# Patient Record
Sex: FEMALE | Race: WHITE | Employment: FULL TIME | URBAN - METROPOLITAN AREA
[De-identification: names, ages, dates, MRNs, and addresses within clinical notes are randomized per-mention and may not be internally consistent; named-entity substitution may affect disease eponyms.]

---

## 2021-07-20 ENCOUNTER — OFFICE VISIT (OUTPATIENT)
Dept: ORTHOPEDIC SURGERY | Age: 36
End: 2021-07-20
Payer: COMMERCIAL

## 2021-07-20 VITALS
BODY MASS INDEX: 34.04 KG/M2 | HEIGHT: 62 IN | WEIGHT: 185 LBS | OXYGEN SATURATION: 99 % | HEART RATE: 79 BPM | RESPIRATION RATE: 16 BRPM | TEMPERATURE: 97.4 F

## 2021-07-20 DIAGNOSIS — Z01.812 PRE-OPERATIVE LABORATORY EXAMINATION: ICD-10-CM

## 2021-07-20 DIAGNOSIS — M25.521 RIGHT ELBOW PAIN: ICD-10-CM

## 2021-07-20 DIAGNOSIS — R20.0 NUMBNESS AND TINGLING IN BOTH HANDS: Primary | ICD-10-CM

## 2021-07-20 DIAGNOSIS — M79.641 PAIN IN BOTH HANDS: ICD-10-CM

## 2021-07-20 DIAGNOSIS — Z01.811 PRE-OPERATIVE RESPIRATORY EXAMINATION: ICD-10-CM

## 2021-07-20 DIAGNOSIS — M79.642 PAIN IN BOTH HANDS: ICD-10-CM

## 2021-07-20 DIAGNOSIS — G56.01 CARPAL TUNNEL SYNDROME ON RIGHT: Primary | ICD-10-CM

## 2021-07-20 DIAGNOSIS — R20.2 NUMBNESS AND TINGLING IN BOTH HANDS: Primary | ICD-10-CM

## 2021-07-20 DIAGNOSIS — M77.11 LATERAL EPICONDYLITIS OF RIGHT ELBOW: ICD-10-CM

## 2021-07-20 DIAGNOSIS — Z01.810 PRE-OPERATIVE CARDIOVASCULAR EXAMINATION: ICD-10-CM

## 2021-07-20 DIAGNOSIS — G56.01 CARPAL TUNNEL SYNDROME ON RIGHT: ICD-10-CM

## 2021-07-20 PROCEDURE — 20605 DRAIN/INJ JOINT/BURSA W/O US: CPT | Performed by: SPECIALIST

## 2021-07-20 PROCEDURE — 99204 OFFICE O/P NEW MOD 45 MIN: CPT | Performed by: SPECIALIST

## 2021-07-20 RX ORDER — CYCLOBENZAPRINE HCL 10 MG
10 TABLET ORAL
COMMUNITY
Start: 2021-05-27

## 2021-07-20 RX ORDER — BETAMETHASONE SODIUM PHOSPHATE AND BETAMETHASONE ACETATE 3; 3 MG/ML; MG/ML
3 INJECTION, SUSPENSION INTRA-ARTICULAR; INTRALESIONAL; INTRAMUSCULAR; SOFT TISSUE ONCE
Status: COMPLETED | OUTPATIENT
Start: 2021-07-20 | End: 2021-07-20

## 2021-07-20 RX ORDER — IBUPROFEN 200 MG
200 TABLET ORAL
COMMUNITY

## 2021-07-20 RX ADMIN — BETAMETHASONE SODIUM PHOSPHATE AND BETAMETHASONE ACETATE 3 MG: 3; 3 INJECTION, SUSPENSION INTRA-ARTICULAR; INTRALESIONAL; INTRAMUSCULAR; SOFT TISSUE at 09:54

## 2021-07-20 NOTE — PROGRESS NOTES
Patient: Heladio Villarreal                MRN: [id number]       SSN: [id number]  YOB: 1985        AGE: 28 y.o. SEX: female    PCP: Reyes Hoard, MD  07/20/21    Chief Complaint   Patient presents with    Elbow Pain     right    Wrist Pain     bilateral     HISTORY:  Heladio Villarreal is a 28 y.o. female who is seen for right elbow, bilateral shoulder R>L, and bilateral wrist pain. She believes she may have injured her right elbow and shoulder while on a recent camping trip. She experiences weakness and numbness when doing overhead activities and movements. She states that her pain was so bad that she was in tears last night. She has been taking ibuprofen. She is also seen for bilateral hand and arm numbness, tingling, and pain. She has been experiencing hand numbness, stinging, burning, and tingling for the past several weeks. She reports difficulty pinching, grabbing, and holding items. She reports increased numbness and tingling at night and with use. She had a BUE EMG/NCS on 1/4/19 which was c/w R carpal tunnel syndrome. She uses braces when not working. She responded to previous DeQuervain's injections years ago. She is s/p neurosurgical decompression procedure for Chiari malformation. She still sees her neurologist every 3 months for follow up. 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 She lives alone in [place]. She has a 9 yo daughter. Her daughter is currently in a [employer] summer camp. Ms. Wanda Lind weighs 185 lbs and is 5'2\" tall.        No results found for: HBA1C, AQS9YJUY, ZJC4FSFS, RLH3PYAO  Weight Metrics 7/20/2021 7/24/2018 7/12/2018 11/9/2016 11/9/2016   Weight 185 lb 171 lb 9.6 oz 167 lb 173 lb 167 lb 8.8 oz   BMI 33.84 kg/m2 32.42 kg/m2 31.55 kg/m2 32.69 kg/m2 31.66 kg/m2       There is no problem list on file for this patient. REVIEW OF SYSTEMS:    Constitutional Symptoms: Negative   Eyes: Negative   Ears, Nose, Throat and Mouth: Negative   Cardiovascular: Negative   Respiratory: Negative   Genitourinary: Per HPI   Gastrointestinal: Per HPI   Integumentary (Skin and/or Breast): Negative   Musculoskeletal: Per HPI   Endocrine/Rheumatologic: Negative   Neurological: Per HPI   Hematology/Lymphatic: Negative    Allergic/Immunologic: Negative   Phychiatric: Negative    Social History     Socioeconomic History    Marital status: UNKNOWN     Spouse name: Not on file    Number of children: Not on file    Years of education: Not on file    Highest education level: Not on file   Occupational History    Not on file   Tobacco Use    Smoking status: Never Smoker    Smokeless tobacco: Never Used   Substance and Sexual Activity    Alcohol use: Yes     Comment: occasional  Social    Drug use: No    Sexual activity: Not on file   Other Topics Concern    Not on file   Social History Narrative    Not on file     Social Determinants of Health     Financial Resource Strain:     Difficulty of Paying Living Expenses:    Food Insecurity:     Worried About Running Out of Food in the Last Year:     Ran Out of Food in the Last Year:    Transportation Needs:     Lack of Transportation (Medical):      Lack of Transportation (Non-Medical):    Physical Activity:     Days of Exercise per Week:     Minutes of Exercise per Session:    Stress:     Feeling of Stress :    Social Connections:     Frequency of Communication with Friends and Family:     Frequency of Social Gatherings with Friends and Family:     Attends Anabaptist Services:     Active Member of Clubs or Organizations:     Attends Club or Organization Meetings:     Marital Status:    Intimate Partner Violence:     Fear of Current or Ex-Partner:     Emotionally Abused:     Physically Abused:     Sexually Abused: Allergies   Allergen Reactions    Latex Hives and Rash    Codeine Rash      Current Outpatient Medications   Medication Sig    ibuprofen (MOTRIN) 200 mg tablet Take 200 mg by mouth.  cyclobenzaprine (FLEXERIL) 10 mg tablet Take 10 mg by mouth.  SUMAtriptan succinate (IMITREX STATDOSE KIT REFILL) 6 mg/0.5 mL crtg 6 mg by SubCUTAneous route as needed.  butalbital-acetaminophen-caffeine (FIORICET, ESGIC) -40 mg per tablet Take 1 Tab by mouth as needed for Pain.  ondansetron (ZOFRAN ODT) 8 mg disintegrating tablet Take 8 mg by mouth as needed for Nausea.  acetaminophen (TYLENOL EXTRA STRENGTH) 500 mg tablet Take 975 mg by mouth every six (6) hours as needed for Pain. (Patient not taking: Reported on 7/20/2021)    HYDROcodone-acetaminophen (NORCO) 5-325 mg per tablet Take  by mouth every four (4) hours as needed for Pain. (Patient not taking: Reported on 7/20/2021)    acyclovir (ZOVIRAX) 800 mg tablet Take 800 mg by mouth as needed. (Patient not taking: Reported on 7/20/2021)     No current facility-administered medications for this visit. PHYSICAL EXAMINATION:  Visit Vitals  Pulse 79   Temp 97.4 °F (36.3 °C)   Resp 16   Ht 5' 2\" (1.575 m)   Wt 185 lb (83.9 kg)   SpO2 99%   BMI 33.84 kg/m²    Appearance: Alert, well appearing and pleasant patient who is in no distress, oriented to person, place/time, and who follows commands. HEENT: Dusty Bray hears well, does not require hearing aids. Her sclera of the eyes are non-icteric. She is breathing normally and no respiratory accessory muscle use is noted. No JVD present and Neck ROM within normal limits. Psychiatric: Affect and mood are appropriate. Oriented x3  Cardiovascular/Peripheral Vascular: Normal pulses to each foot. Integumentary: No rashes.  Warm and normal color. No drainage. Gait: normal  Sensory Exam: decreased sensation to light touch in the median nerve distribution bilateral hands  Lymphatic: No evidence of Lymphedema  Vascular:       Pulses: palpable  Varicosities none  Wounds/Abrasion: None Present  Neuro: Negative, no tremors  ORTHO EXAMINATION:  Examination Right Wrist Left Wrist   Skin Intact Intact   Tenderness - -   Flexion 40 40   Extension 30 30   Deformity - -   Effusion - -   Finger flexion Full Full   Finger extension Full Full   Tinel's sign - -   Phalen's test + +   Finklestein maneuver - -   Pain with thumb abduction - -   Capillary refill - -      Examination Right Hand Left Hand   Skin Intact Intact   Deformity - -   Swelling - -   Tenderness - -   Finger flexion Full Full   Finger extension Full Full   Sensation Normal Normal   Capillary refill Normal Normal   Heberden's nodes - -   Dupuytren's - -     Examination Right Elbow Left Elbow   Skin Intact Intact   Range of Motion 120-0 135-0   Tenderness ++ lateral  -   Swelling - -   Bruising - -   Stability Normal Normal   Motor Strength  Normal Normal   Neurovascular Intact Intact           TIME OUT:  Chart reviewed for the following:   I, Asher Joshi MD, have reviewed the History, Physical and updated the Allergic reactions for Susana Whitt   TIME OUT performed immediately prior to start of procedure:  Kwadwo Bullock MD, have performed the following reviews on Susana Whitt prior to the start of the procedure:          * Patient was identified by name and date of birth   * Agreement on procedure being performed was verified  * Risks and Benefits explained to the patient  * Procedure site verified and marked as necessary  * Patient was positioned for comfort  * Consent was obtained     Time: 9:45 AM     Date of procedure: 7/20/2021  Procedure performed by:  Asher Joshi MD  Ms. Susan Grant tolerated the procedure well with no complications.        RUE EMG/NCS 1/4/19  Impression: There is electrophysiological evidence of a mild median neuropathy at the right wrist. These findings are consistent with mild right carpal tunnel syndrome. IMPRESSION:      ICD-10-CM ICD-9-CM    1. Numbness and tingling in both hands  R20.0 782.0     R20.2     2. Pain in both hands  M79.641 729.5     M79.642     3. Right elbow pain  M25.521 719.42 betamethasone (CELESTONE) injection 3 mg      DRAIN/INJECT INTERMEDIATE JOINT/BURSA   4. Lateral epicondylitis of right elbow  M77.11 726.32 betamethasone (CELESTONE) injection 3 mg      DRAIN/INJECT INTERMEDIATE JOINT/BURSA   5. Carpal tunnel syndrome on right  G56.01 354.0      PLAN:  After discussing treatment options, patient's right lateral epicondyle was injected with 1/2 cc Marcaine and 1/2 cc Celestone. She will be scheduled for a right carpal tunnel release. Risks of surgery outlined and informed consent obtained. She will follow up for H&P. She will follow up as needed.       Scribed by Deacon Rodriguez ([address]) as dictated by Allison Mendiola MD

## 2021-07-28 ENCOUNTER — TELEPHONE (OUTPATIENT)
Dept: ORTHOPEDIC SURGERY | Age: 36
End: 2021-07-28

## 2021-07-28 DIAGNOSIS — Z98.890 POST-OPERATIVE STATE: ICD-10-CM

## 2021-07-28 DIAGNOSIS — G56.01 CARPAL TUNNEL SYNDROME ON RIGHT: Primary | ICD-10-CM

## 2021-07-28 RX ORDER — DIPHENHYDRAMINE HCL 25 MG
25 CAPSULE ORAL
Qty: 20 CAPSULE | Refills: 0 | Status: SHIPPED | OUTPATIENT
Start: 2021-07-28 | End: 2021-08-02

## 2021-07-28 RX ORDER — OXYCODONE AND ACETAMINOPHEN 5; 325 MG/1; MG/1
1 TABLET ORAL
Qty: 28 TABLET | Refills: 0 | Status: SHIPPED | OUTPATIENT
Start: 2021-07-28 | End: 2021-08-04

## 2021-07-28 RX ORDER — ONDANSETRON 4 MG/1
4 TABLET, ORALLY DISINTEGRATING ORAL
Qty: 30 TABLET | Refills: 1 | Status: SHIPPED | OUTPATIENT
Start: 2021-07-28

## 2021-07-29 NOTE — TELEPHONE ENCOUNTER
Received call from patient via answering service. Patient is s/p right wrist surgery completed on 7/28/2021 and states that she was not provided any post op medications. The following prescriptions have been e-prescribed to the patients [address] located on [facility]:    Orders Placed This Encounter    oxyCODONE-acetaminophen (Percocet) 5-325 mg per tablet     Sig: Take 1 Tablet by mouth every six (6) hours as needed for Pain for up to 7 days. Max Daily Amount: 4 Tablets. Dispense:  28 Tablet     Refill:  0    diphenhydrAMINE (BenadryL) 25 mg capsule     Sig: Take 1 Capsule by mouth every six (6) hours as needed for Itching for up to 5 days. Indications: itching     Dispense:  20 Capsule     Refill:  0    ondansetron (ZOFRAN ODT) 4 mg disintegrating tablet     Sig: Take 1 Tablet by mouth every eight (8) hours as needed for Nausea or Vomiting. Indications: prevent nausea and vomiting after surgery     Dispense:  30 Tablet     Refill:  1           Kala King [facility], MPA, PA-C  7/28/2021  8:21 PM

## 2021-07-30 ENCOUNTER — OFFICE VISIT (OUTPATIENT)
Dept: ORTHOPEDIC SURGERY | Age: 36
End: 2021-07-30
Payer: COMMERCIAL

## 2021-07-30 VITALS — TEMPERATURE: 96.7 F

## 2021-07-30 DIAGNOSIS — G56.01 CARPAL TUNNEL SYNDROME ON RIGHT: Primary | ICD-10-CM

## 2021-07-30 DIAGNOSIS — Z98.890 S/P CARPAL TUNNEL RELEASE: Primary | ICD-10-CM

## 2021-07-30 PROCEDURE — 99024 POSTOP FOLLOW-UP VISIT: CPT | Performed by: PHYSICIAN ASSISTANT

## 2021-07-30 NOTE — LETTER
NOTIFICATION RETURN TO WORK    7/30/2021 1:17 PM    Ms. Marisa Prince  [facility] [address] [id number]      To Whom It May Concern:    Marisa Prince is currently under the care of [address] [place] [address]. Please allow the above named patient light or administrative duties until August 23, 2021. Additionally, please excuse Ms Eris Gallego from surgical duties until August 23, 2021. If there are questions or concerns please have the patient contact our office.         Sincerely,      Radhika Ochoa PA-C

## 2021-07-30 NOTE — PROGRESS NOTES
Lina Pitt returns the office 2 days status post right hand open carpal tunnel release. She is doing generally well today. She had some pain and throbbing at the surgical site which does improve when she elevates her hand at or just above the level of her heart. She is gently bending her fingers however still lacking about 6 cm palm to pulp with some stiffness in the wrist just proximal to the surgical site. She has full extension. She has no change in the sensation deficit that was present prior to surgery. The patient's wound was cleaned today and a new sterile dressing placed. Wound measured [id number] 3 cm. Interrupted vertical mattress sutures noted. Methylene blue marker standing also noted with soft tissue swelling bracketing the site. No indurations or evidence of skin breakdown. There was moderate spreading ecchymosis throughout the entire palmar aspect of the right hand and extending about 6 cm proximal from the right hand radiocarpal joint. Plan: Patient return the office in 1 week for wound check and likely suture removal.  Today all of her questions answered to her satisfaction. She is recommended to avoid getting the surgical site wet.

## 2021-08-09 ENCOUNTER — OFFICE VISIT (OUTPATIENT)
Dept: ORTHOPEDIC SURGERY | Age: 36
End: 2021-08-09
Payer: COMMERCIAL

## 2021-08-09 DIAGNOSIS — Z98.890 S/P CARPAL TUNNEL RELEASE: Primary | ICD-10-CM

## 2021-08-09 PROCEDURE — 99024 POSTOP FOLLOW-UP VISIT: CPT | Performed by: SPECIALIST

## 2021-08-09 RX ORDER — SULFAMETHOXAZOLE AND TRIMETHOPRIM 800; 160 MG/1; MG/1
1 TABLET ORAL 2 TIMES DAILY
Qty: 20 TABLET | Refills: 0 | Status: SHIPPED | OUTPATIENT
Start: 2021-08-09

## 2021-08-09 NOTE — PROGRESS NOTES
Patient: Shaina Garcia                MRN: [id number]       SSN: [id number]  YOB: 1985        AGE: 28 y.o. SEX: female    PCP: Tristan Green MD  08/09/21    Chief Complaint   Patient presents with    Wrist Pain     Right     HISTORY:  Shaina Garcia is a 28 y.o. female who is seen for right wrist pain. She is s/p right carpal tunnel release on 7/28/21. She had a yeast infection 48 hours after he surgery-- seen and treated by her gynecologist.     She is s/p neurosurgical decompression procedure for Chiari malformation. She still sees her neurologist every 3 months for follow up. 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 She has a 7 yo daughter. Her daughter is currently in a [employer] summer camp. Ms. Baljinder Best weighs 185 lbs and is 5'2\" tall. No results found for: HBA1C, WUJ2UZGJ, BGH3WVWJ, PTD4PIZG  Weight Metrics 7/20/2021 7/24/2018 7/12/2018 11/9/2016 11/9/2016   Weight 185 lb 171 lb 9.6 oz 167 lb 173 lb 167 lb 8.8 oz   BMI 33.84 kg/m2 32.42 kg/m2 31.55 kg/m2 32.69 kg/m2 31.66 kg/m2       There is no problem list on file for this patient.     REVIEW OF SYSTEMS:    Constitutional Symptoms: Negative   Eyes: Negative   Ears, Nose, Throat and Mouth: Negative   Cardiovascular: Negative   Respiratory: Negative   Genitourinary: Per HPI   Gastrointestinal: Per HPI   Integumentary (Skin and/or Breast): Negative   Musculoskeletal: Per HPI   Endocrine/Rheumatologic: Negative   Neurological: Per HPI   Hematology/Lymphatic: Negative    Allergic/Immunologic: Negative   Phychiatric: Negative    Social History     Socioeconomic History    Marital status: UNKNOWN     Spouse name: Not on file    Number of children: Not on file    Years of education: Not on file    Highest education level: Not on file   Occupational History    Not on file   Tobacco Use    Smoking status: Never Smoker    Smokeless tobacco: Never Used   Substance and Sexual Activity    Alcohol use: Yes     Comment: occasional  Social    Drug use: No    Sexual activity: Not on file   Other Topics Concern    Not on file   Social History Narrative    Not on file     Social Determinants of Health     Financial Resource Strain:     Difficulty of Paying Living Expenses:    Food Insecurity:     Worried About Running Out of Food in the Last Year:     [address] in the Last Year:    Transportation Needs:     Lack of Transportation (Medical):  Lack of Transportation (Non-Medical):    Physical Activity:     Days of Exercise per Week:     Minutes of Exercise per Session:    Stress:     Feeling of Stress :    Social Connections:     Frequency of Communication with Friends and Family:     Frequency of Social Gatherings with Friends and Family:     Attends Confucianist Services:     Active Member of Clubs or Organizations:     Attends Club or Organization Meetings:     Marital Status:    Intimate Partner Violence:     Fear of Current or Ex-Partner:     Emotionally Abused:     Physically Abused:     Sexually Abused: Allergies   Allergen Reactions    Latex Hives and Rash    Codeine Rash      Current Outpatient Medications   Medication Sig    ondansetron (ZOFRAN ODT) 4 mg disintegrating tablet Take 1 Tablet by mouth every eight (8) hours as needed for Nausea or Vomiting. Indications: prevent nausea and vomiting after surgery    ibuprofen (MOTRIN) 200 mg tablet Take 200 mg by mouth.  cyclobenzaprine (FLEXERIL) 10 mg tablet Take 10 mg by mouth.  acetaminophen (TYLENOL EXTRA STRENGTH) 500 mg tablet Take 975 mg by mouth every six (6) hours as needed for Pain.  (Patient not taking: Reported on 7/20/2021)  HYDROcodone-acetaminophen (NORCO) 5-325 mg per tablet Take  by mouth every four (4) hours as needed for Pain. (Patient not taking: Reported on 7/20/2021)    SUMAtriptan succinate (IMITREX STATDOSE KIT REFILL) 6 mg/0.5 mL crtg 6 mg by SubCUTAneous route as needed.  acyclovir (ZOVIRAX) 800 mg tablet Take 800 mg by mouth as needed. (Patient not taking: Reported on 7/20/2021)    butalbital-acetaminophen-caffeine (FIORICET, ESGIC) -40 mg per tablet Take 1 Tab by mouth as needed for Pain.  ondansetron (ZOFRAN ODT) 8 mg disintegrating tablet Take 8 mg by mouth as needed for Nausea. No current facility-administered medications for this visit. PHYSICAL EXAMINATION:   Appearance: Alert, well appearing and pleasant patient who is in no distress, oriented to person, place/time, and who follows commands. HEENT: Wendy Erb hears well, does not require hearing aids. Her sclera of the eyes are non-icteric. She is breathing normally and no respiratory accessory muscle use is noted. No JVD present and Neck ROM within normal limits. Psychiatric: Affect and mood are appropriate. Oriented x3  Cardiovascular/Peripheral Vascular: Normal pulses to each foot. Integumentary: No rashes. Warm and normal color. No drainage.    Gait: normal  Sensory Exam: decreased sensation to light touch in the median nerve distribution bilateral hands  Lymphatic: No evidence of Lymphedema  Vascular:       Pulses: palpable  Varicosities none  Wounds/Abrasion: None Present  Neuro: Negative, no tremors  ORTHO EXAMINATION:  Examination Right Wrist Left Wrist   Skin Intact, healing incision site  Suture line intact Intact   Tenderness - -   Flexion 40 40   Extension 30 30   Deformity - -   Effusion - -   Finger flexion Full Full   Finger extension Full Full   Tinel's sign - -   Phalen's test + +   Finklestein maneuver - -   Pain with thumb abduction - -   Capillary refill - -         RUE EMG/NCS 1/4/19  Impression: There is electrophysiological evidence of a mild median neuropathy at the right wrist. These findings are consistent with mild right carpal tunnel syndrome. IMPRESSION:    R CTS  PLAN: Sutures out. She will follow up in one month.     Scribed by Anjali Cheney ([address]) as dictated by Efrain Jamil MD

## 2021-08-09 NOTE — LETTER
NOTIFICATION RETURN TO WORK    8/9/2021 4:34 PM    Ms. Wesley Nunez  [facility] [address] [phone]      To Whom It May Concern:    Wesley Nunez is currently under the care of [address] [place] [address]. She was seen in our office today, August 9, 2021. Please excuse Ms Andrzej Goss from work Thursday, August 11, 2021. If there are questions or concerns please have the patient contact our office.         Sincerely,      Zoey Hall MD

## 2021-08-13 ENCOUNTER — OFFICE VISIT (OUTPATIENT)
Dept: ORTHOPEDIC SURGERY | Age: 36
End: 2021-08-13
Payer: COMMERCIAL

## 2021-08-13 VITALS — WEIGHT: 188 LBS | BODY MASS INDEX: 34.6 KG/M2 | HEIGHT: 62 IN

## 2021-08-13 DIAGNOSIS — Z98.890 S/P CARPAL TUNNEL RELEASE: Primary | ICD-10-CM

## 2021-08-13 PROCEDURE — 99024 POSTOP FOLLOW-UP VISIT: CPT | Performed by: PHYSICIAN ASSISTANT

## 2021-08-13 NOTE — PROGRESS NOTES
Cheri Pulido returns the office for wound check associated with her carpal tunnel release site. The site is improved significantly since she was seen earlier in the week. She is change the dressing daily. She has some discharge which was thin and yellowish on her Band-Aid yesterday but no evidence of joes infection. She is on Septra DS empirically 1 twice daily. She is taken 3 days worth of medication. On exam today the wound site is maturing nicely wound borders well approximated the soft tissue swelling which was evident prior when the sutures were present has near completely resolved. There is slight tenderness to palpation over the proximal third of the surgical site bracketing both sides. No erythema no evidence of infection. No active drainage noted today. Patient was seen and examined by Dr. Quinten Porter. He agrees wound is maturing nicely has expected. Patient will maintain her limited duty work status. Working to begin OT with focus on range of motion and gentle stretching. We will see her back in about 3 weeks. She is recommended to complete only 5 days worth of Septra DS at this point which would be 10 tablets total.  Today all of her questions answered to her satisfaction. She may get the surgical site wet but avoid any prolonged soaking.

## 2021-08-23 ENCOUNTER — TELEPHONE (OUTPATIENT)
Dept: ORTHOPEDIC SURGERY | Age: 36
End: 2021-08-23

## 2021-08-23 NOTE — TELEPHONE ENCOUNTER
OK per JOHANN Loera for letter excusing from work until after therapy starts. Letter created and signed at [facility] location. Called patient and left generic message, no identifiers. If patient calls back, letter is in the system for  at [facility]. Due to HIPAA we cannot email letter. Patient may set up [employer] account to access if desired.

## 2021-08-23 NOTE — TELEPHONE ENCOUNTER
Patient says she was advised at last office visit that she should have limited work status until she begins therapy, however, she never got an updated letter from us to provide her employer. She's at work now and needs this letter as soon as possible to provide them. Please prepare and advise patient when ready, [phone] (she is not currently enrolled in [employer] and is asking if it's possible to email her this letter at [email or web address])

## 2021-09-07 ENCOUNTER — HOSPITAL ENCOUNTER (OUTPATIENT)
Dept: PHYSICAL THERAPY | Age: 36
Discharge: HOME | End: 2021-09-07
Attending: PHYSICIAN ASSISTANT
Payer: COMMERCIAL

## 2021-09-07 PROCEDURE — 97530 THERAPEUTIC ACTIVITIES: CPT

## 2021-09-07 PROCEDURE — 97535 SELF CARE MNGMENT TRAINING: CPT

## 2021-09-07 PROCEDURE — 97165 OT EVAL LOW COMPLEX 30 MIN: CPT

## 2021-09-07 PROCEDURE — 97110 THERAPEUTIC EXERCISES: CPT

## 2021-09-07 NOTE — PROGRESS NOTES
Hand Therapy Evaluation and Daily Note    Patient Name: Agus Weir  Date:2021  : 1985  Age: 28 y.o.y/o  [x]  Patient  Verified  Payor: Jaswantisaac Carly / Plan: Clark Lopez PPO / Product Type: PPO /    Referring Provider: Brian Ramirez MD Visit:  None scheduled  Onset Date:  2 years ago  Surgical Date: 2021  Surgical Procedure: right open carpal tunnel release    In time:12:55 PM  Out time:1:45 PM  Total Treatment Time (min): 55  Total Timed Codes (min): 40  1:1 Treatment Time (MC only): 54   Visit #: 1 of 8    Treatment Area: Status post carpal tunnel release [Z98.890]    Precautions:    Hand Dominance: right handed   Hand Involved: right    Total Evaluation Time:  15    History of Present Condition:  Patient is a right hand dominant 28 y.o. female with a chief complaint  of right wrist pain s/p right open carpal tunnel release on 2021. Pt report an EMG 2 years ago that revealed bilateral median nerve compression at the wrists. She reports she received steroid injections as part of conservative mgmt however had minimal relief. Pain Rating:   Current: (0-no pain 10-debilitating pain) moderate 3  At best: (0-no pain 10-debilitating pain) moderate 3  At worst: (0-no pain 10-debilitating pain) moderate 3  Location: right hand and right wrist  Type:  moderate   Better with: ice, ibuprofen, tylenol  Worse with:     Medications/Allergies/Past Medical History:  See chart; reviewed with patient.  Allergies, back pain, headaches, sleep dysfunction, median nerve compression at the wrists    Diagnostic Tests: EMG revealed bilateral median nerve compression at the wrists    Prior Level of Function: Independent with ADL/IADL tasks using dominant right UE    Current Level of Function:  Min A with ADL/IADL tasks     Social History: Pt lives with 9 y/o daughter    Occupation/Job Requirements: surgical assistant     Observation: 2.5 cm incisino intact and healed  Scar/incision:   Band aid applied  Location:  Right volar wrist     Palpation:  Tenderness with palpation to surrounding areas at incision    Range of Motion:   Wrist Active/Passive ROM  Wrist 9/7/2021     Right       Flex 47       Ext 60       UD 30       RD 9                   Strength:   Measurements: Taken with Martínez Dynamometer, in Lbs   Level 2 9/7/2021    Date Date Date Date Date Date   Right 17         Left 61         Deficit          Change                Pinch Measurements: Taken with Pinch Gauge, in Lbs   (hand) 9/7/2021    Date Date Date Date Date   Lateral          Right 10        Left  15        Deficit         Change         Pad         Right 7        Left 10        Deficit         Change         Bijan         Right 10        Left 19        Deficit         Change             Sensation:    intact      Edema: GIRTH CHART measured in cm  Date: 9/7/2021       Side Right/Left    DPC circum. 20.1/19.5    Wrist Crease 16.8/16.1    FA      Elbow           Special Tests: N/a    ADLs  Feeding:        []MaxA   []ModA   []Tamika   [] CGA   []SBA   []Isidoro   [x]Independent  UE Dressing:       []MaxA   []ModA   [x]Tamika   [] CGA   []SBA   []Isidoro   []Independent  LE Dressing:       []MaxA   []ModA   [x]Tamika   [] CGA   []SBA   []Isidoro   []Independent  Grooming:       []MaxA   []ModA   [x]Tamika   [] CGA   []SBA   []Isidoro   []Independent  Toileting:       []MaxA   []ModA   []Tamika   [] CGA   []SBA   []Isidoro   [x]Independent  Bathing:       []MaxA   []ModA   []Tamika   [] CGA   []SBA   []Isidoro   [x]Independent  Light Meal Prep:    []MaxA   []ModA   []Tamika   [] CGA   []SBA   []Isidoro   []Independent  Household/Other: []MaxA   []ModA   [x]Tamika   [] CGA   []SBA   []Isidoro   []Independent  Adaptive Equip:     []MaxA   []ModA   []Tamika   [] CGA   []SBA   []Isidoro   []Independent  Driving:       []MaxA   []ModA   []Tamika   [] CGA   []SBA   []Isidoro   [x]Independent      Todays Treatment:  Patient received an initial evaluation today followed by education as to diagnosis, precautions and treatment plan. Patient was provided with a basic home exercise program including tendon glides, scar desensitization program, and  and pinch strengthening with graded therapy putty. OBJECTIVE    15 min Therapeutic Exercise:  [x] See flow sheet :   Rationale: increase ROM, increase strength, improve coordination and increase proprioception to improve the patients ability to increase functional use of right UE    10 min Therapeutic Activity:  [x]  See flow sheet :   Rationale: increase ROM and decrease hypersensitivity  to improve the patients ability to return to normal use of right UE. 15 min Self Care/Home Management: prognosis, diagnosis, treatment plan, activity modifications, scar mgmt   Rationale: education  to improve the patients ability to promote healing of surgical site. With   [] TE   [] TA   [] neuro   [] other: Patient Education: [x] Review HEP    [] Progressed/Changed HEP based on:   [] positioning   [] body mechanics   [] transfers   [] heat/ice application   [] Splint wear/care   [] Sensory re-education   [] scar management      [] other:      Pain Level (0-10 scale) post treatment: 3/10    Patient will continue to benefit from skilled OT services to modify and progress therapeutic interventions, address ROM deficits, address strength deficits and analyze and address soft tissue restrictions to attain goals. Assessment: Pt presents to skilled OT today with band aid applied over right volar wrist. Upon removal, there is a 2.5 cm scar that is healed and intact with mild redness. There is tenderness with palpation to incision and surrounding areas and pillar pain noted. She demonstrates limited wrist flexion at 47 degrees. There is hypersensitivity noted with light touch over the scar. She also demonstrates >50% reduced right hand  and pinch strength when compared to her left non dominant hand.   There is mild edema noted about the wrist crease and DPC circumference of the hand. Evaluation Complexity: History LOW Complexity : Brief history review  Examination LOW Complexity : 1-3 performance deficits relating to physical, cognitive , or psychosocial skils that result in activity limitations and / or participation restrictions  Clinical Decision Making LOW Complexity : No comorbidities that affect functional and no verbal or physical assistance needed to complete eval tasks   Overall Complexity Rating: LOW     Patient would benefit from OT/Hand therapy services for the following problems:  Problem List: Pain effecting function, Decreased range of motion, Decreased strength, Edema effecting function, Decreased coordination/prehension, Decreased ADL/functional abilities , Decreased activity tolerance, Decreased flexibility/joint mobility and Sensability     Treatment Plan may include any combination of the following: Therapeutic exercise, Therapeutic activities, Neuromuscular re-education, Physical agent/modality, Scar management, Manual therapy, Patient education and ADLs/IADLs    Patient / Family readiness to learn indicated by: asking questions, trying to perform skills and interest    Persons(s) to be included in education:   patient (P)    Barriers to Learning/Limitations: None    Patient Goal (s): to get back to work    Patient Self Reported Health Status: good    Rehabilitation Potential: good    Short Term Goals: To be accomplished in 2  weeks:  Goal:* Patient will be compliant with initial home exercise program to take an active role in their rehabilitation process. Status at [facility]: Patient was provided with a basic home exercise program including tendon glides, scar desensitization program, and  and pinch strengthening with graded therapy putty. Goal:* Patient will demonstrate a good understanding of their condition and strategies for self-management.   Status at [facility]: pt educated on prognosis, diagnosis, treatment plan, activity modifications, scar mgmt    Long Term Goals: To be accomplished in 4 weeks:   Goal:* Patient will have 65 degrees of right wrist extension in order to increase ease with ADL's such as bathing, eating and dressing and to eventually bear weight thru the right upper extremity as in pushing up from a chair. Status at Eval: 60 deg     Goal:* Patient will have 60 degrees of right wrist flexion in order to perform hygiene tasks and /or work with tools such as a screwdriver. Status at Eval: 47 deg    Goal:* Pt will have 30 pounds of  in the right hand to allow for functional grasp for all ADL activities including dressing, bathing and self care. Status at eval: 17#    Goal:* Patient will have improved right lateral pinch strength of  12 pounds in order to perform fine motor tasks such as turning pages, turning ignition and open containers. Status at eval: 10#    Goal:* Patient will have improved right mario pinch strength of 13 pounds in order to perform fine motor tasks such as writing. Status at eval: 10#    Goal:* Patient will show a 5 point improvement on FOTO functional status measure to improve overall functional performance.   Status at Eval: 72    Frequency / Duration: Patient to be seen 2 times per week for 4 weeks:    Patient/ Caregiver education and instruction: Diagnosis, prognosis, self care, activity modification and exercises    Archie Grady OT,  9/7/2021 1:00 PM

## 2021-09-07 NOTE — PROGRESS NOTES
[facility] [address]  [place], [address]  [phone] fax    Plan of Care/Statement of Necessity for Occupational Therapy Services    Patient name: Ellis Gillis Start of Care: 2021   Referral source: Lamine Jon : 1985    Medical Diagnosis: Status post carpal tunnel release [Z98.890]  Payor: Selwyn Vidal / Plan: Fay Hawkins PPO / Product Type: PPO /  Onset Date:2021    Treatment Diagnosis: right hand pain   Prior Hospitalization: see medical history Provider#: [id number]   Medications: Verified on Patient summary List    Comorbidities: Allergies, back pain, headaches, sleep dysfunction, median nerve compression at the wrists   Prior Level of Function: Independent with ADL/IADL tasks using dominant right UE. The Plan of Care and following information is based on the information from the initial evaluation. Assessment/ key information: Patient is a right hand dominant 28 y.o. female with a chief complaint  of right wrist pain s/p right open carpal tunnel release on 2021. Pt report an EMG 2 years ago that revealed bilateral median nerve compression at the wrists. She reports she received steroid injections as part of conservative mgmt however had minimal relief. Pt presents to skilled OT today with band aid applied over right volar wrist. Upon removal, there is a 2.5 cm scar that is healed and intact with mild redness. There is tenderness with palpation to incision and surrounding areas and pillar pain noted. She demonstrates limited wrist flexion at 47 degrees. There is hypersensitivity noted with light touch over the scar. She also demonstrates >50% reduced right hand  and pinch strength when compared to her left non dominant hand. There is mild edema noted about the wrist crease and DPC circumference of the hand. She reports limiting use of the right hand for regular daily activities.  Patient received an initial evaluation today followed by education as to diagnosis, precautions and treatment plan. Patient was provided with a basic home exercise program including tendon glides, scar desensitization program, and  and pinch strengthening with graded therapy putty. Skilled OT services are necessary to address deficits and improve quality of life. Evaluation Complexity: History LOW Complexity : Brief history review  Examination LOW Complexity : 1-3 performance deficits relating to physical, cognitive , or psychosocial skils that result in activity limitations and / or participation restrictions  Clinical Decision Making LOW Complexity : No comorbidities that affect functional and no verbal or physical assistance needed to complete eval tasks   Overall Complexity Rating: LOW     Patient would benefit from OT/Hand therapy services for the following problems:  Problem List: Pain effecting function, Decreased range of motion, Decreased strength, Edema effecting function, Decreased coordination/prehension, Decreased ADL/functional abilities , Decreased activity tolerance, Decreased flexibility/joint mobility and Sensability     Treatment Plan may include any combination of the following: Therapeutic exercise, Therapeutic activities, Neuromuscular re-education, Physical agent/modality, Scar management, Manual therapy, Patient education and ADLs/IADLs    Patient / Family readiness to learn indicated by: asking questions, trying to perform skills and interest    Persons(s) to be included in education:   patient (P)    Barriers to Learning/Limitations: None    Patient Goal (s): to get back to work    Patient Self Reported Health Status: good    Rehabilitation Potential: good    Short Term Goals: To be accomplished in 2  weeks:  Goal:* Patient will be compliant with initial home exercise program to take an active role in their rehabilitation process.   Status at Eval: Patient was provided with a basic home exercise program including tendon glides, scar desensitization program, and  and pinch strengthening with graded therapy putty. Goal:* Patient will demonstrate a good understanding of their condition and strategies for self-management. Status at Eval: pt educated on prognosis, diagnosis, treatment plan, activity modifications, scar mgmt    Long Term Goals: To be accomplished in 4 weeks:   Goal:* Patient will have 65 degrees of right wrist extension in order to increase ease with ADL's such as bathing, eating and dressing and to eventually bear weight thru the right upper extremity as in pushing up from a chair. Status at Eval: 60 deg     Goal:* Patient will have 60 degrees of right wrist flexion in order to perform hygiene tasks and /or work with tools such as a screwdriver. Status at Eval: 47 deg    Goal:* Pt will have 30 pounds of  in the right hand to allow for functional grasp for all ADL activities including dressing, bathing and self care. Status at eval: 17#    Goal:* Patient will have improved right lateral pinch strength of  12 pounds in order to perform fine motor tasks such as turning pages, turning ignition and open containers. Status at eval: 10#    Goal:* Patient will have improved right mario pinch strength of 13 pounds in order to perform fine motor tasks such as writing. Status at eval: 10#    Goal:* Patient will show a 5 point improvement on FOTO functional status measure to improve overall functional performance. Status at Eval: 72    Frequency / Duration: Patient to be seen 2 times per week for 4 weeks:    Patient/ Caregiver education and instruction: Diagnosis, prognosis, self care, activity modification and exercises  [x]  Plan of care has been reviewed with Mary Rivera OT 9/7/2021 2:05 PM  ________________________________________________________________________    I certify that the above Therapy Services are being furnished while the patient is under my care.  I agree with the treatment plan and certify that this therapy is necessary.     [phone] [id number] Signature:____________Date:_________TIME:________     Clara Taylor PA-C  ** Signature, Date and Time must be completed for valid certification **    Please sign and return to [facility] [address]  [address] [facility] [address]  [place], [address]  [phone] fax

## 2021-09-14 ENCOUNTER — HOSPITAL ENCOUNTER (OUTPATIENT)
Dept: PHYSICAL THERAPY | Age: 36
Discharge: HOME | End: 2021-09-14
Attending: PHYSICIAN ASSISTANT
Payer: COMMERCIAL

## 2021-09-14 PROCEDURE — 97022 WHIRLPOOL THERAPY: CPT

## 2021-09-14 PROCEDURE — 97110 THERAPEUTIC EXERCISES: CPT

## 2021-09-14 NOTE — PROGRESS NOTES
OT DAILY TREATMENT NOTE     Patient Name: Derick Gasca  Date:2021  : 1985  [x]  Patient  Verified  Payor: Lily Braun / Plan: Rolando Herzog PPO / Product Type: PPO /    In time:12:00 PM  Out time:12:46 PM  Total Treatment Time (min): 55  Visit #: 2 of 8    Treatment Area: Status post carpal tunnel release [Z98.890]    SUBJECTIVE  Pain Level (0-10 scale): 0/10  Any medication changes, allergies to medications, adverse drug reactions, diagnosis change, or new procedure performed?: [x] No    [] Yes (see summary sheet for update)  Subjective functional status/changes:   [] No changes reported  \"It's actually a lot better and not as sensitive. \"    OBJECTIVE    Modality rationale: decrease pain and increase tissue extensibility to improve the patients ability to functionally use right hand.     Min Type Additional Details    [] Estim:  []Unatt       []IFC  []Premod                        []Other:  []w/ice   []w/heat  Position:  Location:    [] Estim: []Att    []TENS instruct  []NMES                    []Other:  []w/US   []w/ice   []w/heat  Position:  Location:    []  Traction: [] Cervical       []Lumbar                       [] Prone          []Supine                       []Intermittent   []Continuous Lbs:  [] before manual  [] after manual    []  Ultrasound: []Continuous   [] Pulsed                           []1MHz   []3MHz W/cm2:  Location:    []  Iontophoresis with dexamethasone         Location: [] Take home patch   [] In clinic   15 []  Ice     [x]  Heat fluidotherapy  []  Ice massage  []  Laser   []  Paraffin Position: AROM  Location: right hand    []  Laser with stim  []  Other:  Position:  Location:    []  Vasopneumatic Device    []  Right     []  Left  Pre-treatment girth:  Post-treatment girth:  Measured at (location):  Pressure:       [] lo [] med [] hi   Temperature: [] lo [] med [] hi       [x] Skin assessment post-treatment:  [x]intact []redness- no adverse reaction    []redness  adverse reaction:     31 min Therapeutic Exercise:  [x] See flow sheet :   Rationale: increase ROM, increase strength and improve coordination to improve the patients ability to increase functional use of right hand. With   [] TE   [] TA   [] neuro   [] other: Patient Education: [x] Review HEP    [] Progressed/Changed HEP based on:   [] positioning   [] body mechanics   [] transfers   [] heat/ice application   [] Splint wear/care   [] Sensory re-education   [] scar management      [] other:            Other Objective/Functional Measures: improved tolerance to touch over right volar wrist scar     Pain Level (0-10 scale) post treatment: 0/10    ASSESSMENT/Changes in Function: Improving touch to right volar wrist.  Initiated fluidotherapy without difficulty reported. Minor c/o pain with pinch tasks. Will progress as tolerated. Patient will continue to benefit from skilled OT services to modify and progress therapeutic interventions, address ROM deficits, address strength deficits and analyze and address soft tissue restrictions to attain remaining goals. []  See Plan of Care  []  See progress note/recertification  []  See Discharge Summary         Progress towards goals / Updated goals:  Short Term Goals: To be accomplished in 2  weeks:  Goal:* Patient will be compliant with initial home exercise program to take an active role in their rehabilitation process. Status at [facility]: Patient was provided with a basic home exercise program including tendon glides, scar desensitization program, and  and pinch strengthening with graded therapy putty. 9/14/2021 - pt reports compliance with HEP      Goal:* Patient will demonstrate a good understanding of their condition and strategies for self-management.   Status at [facility]: pt educated on prognosis, diagnosis, treatment plan, activity modifications, scar mgmt  9/14/2021 - pt reports compliance with scar desensitization and demo improved tolerance to touch     Long Term Goals: To be accomplished in 4 weeks:              Goal:* Patient will have 65 degrees of right wrist extension in order to increase ease with ADL's such as bathing, eating and dressing and to eventually bear weight thru the right upper extremity as in pushing up from a chair. Status at Eval: 60 deg      Goal:* Patient will have 60 degrees of right wrist flexion in order to perform hygiene tasks and /or work with tools such as a screwdriver. Status at Eval: 47 deg     Goal:* Pt will have 30 pounds of  in the right hand to allow for functional grasp for all ADL activities including dressing, bathing and self care. Status at eval: 17#     Goal:* Patient will have improved right lateral pinch strength of  12 pounds in order to perform fine motor tasks such as turning pages, turning ignition and open containers. Status at eval: 10#     Goal:* Patient will have improved right mario pinch strength of 13 pounds in order to perform fine motor tasks such as writing. Status at eval: 10#     Goal:* Patient will show a 5 point improvement on FOTO functional status measure to improve overall functional performance.   Status at Eval: 72    PLAN  []  Upgrade activities as tolerated     [x]  Continue plan of care  []  Update interventions per flow sheet       []  Discharge due to:_  []  Other:_      Lu Chase OT 9/14/2021  12:06 PM    Future Appointments   Date Time Provider Roland Conn   9/21/2021  4:30 PM [facility]   [facility]

## 2021-09-21 ENCOUNTER — HOSPITAL ENCOUNTER (OUTPATIENT)
Dept: PHYSICAL THERAPY | Age: 36
Discharge: HOME | End: 2021-09-21
Attending: PHYSICIAN ASSISTANT
Payer: COMMERCIAL

## 2021-09-21 PROCEDURE — 97110 THERAPEUTIC EXERCISES: CPT

## 2021-09-21 PROCEDURE — 97022 WHIRLPOOL THERAPY: CPT

## 2021-09-21 NOTE — PROGRESS NOTES
OT DAILY TREATMENT NOTE     Patient Name: Vicky Henry  Date:2021  : 1985  [x]  Patient  Verified  Payor: Buck Kelley / Plan: Noe Rosado PPO / Product Type: PPO /    In time:4:30 PM  Out time:5:15 PM  Total Treatment Time (min): 45  Visit #: 3 of 8    Treatment Area: Status post carpal tunnel release [Z98.890]    SUBJECTIVE  Pain Level (0-10 scale): 0/10  Any medication changes, allergies to medications, adverse drug reactions, diagnosis change, or new procedure performed?: [x] No    [] Yes (see summary sheet for update)  Subjective functional status/changes:   [] No changes reported  \"It's a lot better. \"    OBJECTIVE    Modality rationale: decrease pain and increase tissue extensibility to improve the patients ability to increase functional use of right hand.     Min Type Additional Details    [] Estim:  []Unatt       []IFC  []Premod                        []Other:  []w/ice   []w/heat  Position:  Location:    [] Estim: []Att    []TENS instruct  []NMES                    []Other:  []w/US   []w/ice   []w/heat  Position:  Location:    []  Traction: [] Cervical       []Lumbar                       [] Prone          []Supine                       []Intermittent   []Continuous Lbs:  [] before manual  [] after manual    []  Ultrasound: []Continuous   [] Pulsed                           []1MHz   []3MHz W/cm2:  Location:    []  Iontophoresis with dexamethasone         Location: [] Take home patch   [] In clinic   15 []  Ice     [x]  Heat fluidotherapy  []  Ice massage  []  Laser   []  Paraffin Position: AROM  Location: right wrist/hand    []  Laser with stim  []  Other:  Position:  Location:    []  Vasopneumatic Device    []  Right     []  Left  Pre-treatment girth:  Post-treatment girth:  Measured at (location):  Pressure:       [] lo [] med [] hi   Temperature: [] lo [] med [] hi       [x] Skin assessment post-treatment:  [x]intact [x]redness- no adverse reaction    []redness - adverse reaction:   25 min Therapeutic Exercise:  [x] See flow sheet :   Rationale: increase ROM, increase strength and increase proprioception to improve the patients ability to grasp,  and pinch using right hand. 5 min Manual Therapy:  IASTM using tools #4, 6 using sweeping and fanning techniques   The manual therapy interventions were performed at a separate and distinct time from the therapeutic activities interventions. Rationale: decrease pain, increase ROM, increase tissue extensibility and decrease edema  to right volar wrist scar       With   [] TE   [] TA   [] neuro   [] other: Patient Education: [x] Review HEP    [] Progressed/Changed HEP based on:   [] positioning   [] body mechanics   [] transfers   [] heat/ice application   [] Splint wear/care   [] Sensory re-education   [] scar management      [] other:            Other Objective/Functional Measures: improved right hand pinch strength with activity      Pain Level (0-10 scale) post treatment: 0/10    ASSESSMENT/Changes in Function: Initiated IASTM to right volar wrist and palm scar and pt tolerated this well. No increased pain with increased resistive activity. Will progress with strengthening as tolerated. Patient will continue to benefit from skilled OT services to modify and progress therapeutic interventions, address ROM deficits, address strength deficits, analyze and address soft tissue restrictions and instruct in home and community integration to attain remaining goals. []  See Plan of Care  []  See progress note/recertification  []  See Discharge Summary         Progress towards goals / Updated goals:  Short Term Goals: To be accomplished in 2  weeks:  Goal:* Patient will be compliant with initial home exercise program to take an active role in their rehabilitation process.   Status at al: Patient was provided with a basic home exercise program including tendon glides, scar desensitization program, and  and pinch strengthening with graded therapy putty. 9/14/2021 - pt reports compliance with HEP  9/21/2021 - pt reports continued compliance, goal met      Goal:* Patient will demonstrate a good understanding of their condition and strategies for self-management. Status at Eval: pt educated on prognosis, diagnosis, treatment plan, activity modifications, scar mgmt  9/14/2021 - pt reports compliance with scar desensitization and demo improved tolerance to touch   9/21/2021 - good tolerance to touch on scar, goal met    Long Term Goals: To be accomplished in 4 weeks:              Goal:* Patient will have 65 degrees of right wrist extension in order to increase ease with ADL's such as bathing, eating and dressing and to eventually bear weight thru the right upper extremity as in pushing up from a chair. Status at Eval: 60 deg      Goal:* Patient will have 60 degrees of right wrist flexion in order to perform hygiene tasks and /or work with tools such as a screwdriver. Status at Eval: 47 deg     Goal:* Pt will have 30 pounds of  in the right hand to allow for functional grasp for all ADL activities including dressing, bathing and self care. Status at eval: 17#     Goal:* Patient will have improved right lateral pinch strength of  12 pounds in order to perform fine motor tasks such as turning pages, turning ignition and open containers. Status at eval: 10#  9/21/2021 - progressing with in clinic tasks. Goal:* Patient will have improved right mario pinch strength of 13 pounds in order to perform fine motor tasks such as writing. Status at eval: 10#  9/21/2021 - progressing with in clinic tasks.      Goal:* Patient will show a 5 point improvement on FOTO functional status measure to improve overall functional performance.   Status at Eval: 65    PLAN  []  Upgrade activities as tolerated     [x]  Continue plan of care  []  Update interventions per flow sheet       []  Discharge due to:_  []  Other:_      Bhupinder Guzman OT 9/21/2021  3:59 PM    Future Appointments   Date Time Provider Roland Conn   9/21/2021  4:30 PM Alok Quiles, [facility]   9/23/2021  4:30 PM Alok Quiles OT [facility]

## 2021-10-28 NOTE — PROGRESS NOTES
[facility] [address] [facility]  [place], [address]  [phone] fax    Occupational Therapy Discharge Summary    Patient name: Rosy Reynolds Start of Care: 2021   Referral source: [facility] : 1985   Medical/Treatment Diagnosis: Status post carpal tunnel release [Z98.890]  Payor: Eliana Lux / Plan: [id number] RPN / Product Type: Commerical /  Onset Date:2021     Prior Hospitalization: see medical history Provider#: [id number]   Medications: Verified on Patient Summary List     Comorbidities: Allergies, back pain, headaches, sleep dysfunction, median nerve compression at the wrists   Prior Level of Function: Independent with ADL/IADL tasks using dominant right UE. Visits from Start of Care: 3    Missed Visits: 2  Reporting Period : 2021 to 2021    Summary of Care:  Short Term Goals: To be accomplished in 2  weeks:  Goal:* Patient will be compliant with initial home exercise program to take an active role in their rehabilitation process. Status at Eval: Patient was provided with a basic home exercise program including tendon glides, scar desensitization program, and  and pinch strengthening with graded therapy putty. 2021 - pt reports compliance with HEP  2021 - pt reports continued compliance, goal met      Goal:* Patient will demonstrate a good understanding of their condition and strategies for self-management.   Status at [facility]: pt educated on prognosis, diagnosis, treatment plan, activity modifications, scar mgmt  2021 - pt reports compliance with scar desensitization and demo improved tolerance to touch   2021 - good tolerance to touch on scar, goal met     Long Term Goals: To be accomplished in 4 weeks:              Goal:* Patient will have 65 degrees of right wrist extension in order to increase ease with ADL's such as bathing, eating and dressing and to eventually bear weight thru the right upper extremity as in pushing up from a chair. Status at Eval: 60 deg      Goal:* Patient will have 60 degrees of right wrist flexion in order to perform hygiene tasks and /or work with tools such as a screwdriver. Status at Eval: 47 deg     Goal:* Pt will have 30 pounds of  in the right hand to allow for functional grasp for all ADL activities including dressing, bathing and self care. Status at eval: 17#     Goal:* Patient will have improved right lateral pinch strength of  12 pounds in order to perform fine motor tasks such as turning pages, turning ignition and open containers. Status at eval: 10#  9/21/2021 - progressing with in clinic tasks.      Goal:* Patient will have improved right mario pinch strength of 13 pounds in order to perform fine motor tasks such as writing. Status at eval: 10#  9/21/2021 - progressing with in clinic tasks.      Goal:* Patient will show a 5 point improvement on FOTO functional status measure to improve overall functional performance. Status at Eval: 65    ASSESSMENT/RECOMMENDATIONS: Unable to further assess goals due to pt abdicated therapy. Will d/c at this time.    [x]Discontinue therapy: []Patient has reached or is progressing toward set goals      [x]Patient is non-compliant or has abdicated      []Due to lack of appreciable progress towards set goals    Ramos Rom, OT 10/28/2021 11:01 AM

## 2022-03-16 ENCOUNTER — OFFICE VISIT (OUTPATIENT)
Dept: ORTHOPEDIC SURGERY | Age: 37
End: 2022-03-16
Payer: COMMERCIAL

## 2022-03-16 VITALS
HEART RATE: 95 BPM | WEIGHT: 180 LBS | TEMPERATURE: 98.6 F | BODY MASS INDEX: 33.99 KG/M2 | OXYGEN SATURATION: 98 % | HEIGHT: 61 IN

## 2022-03-16 DIAGNOSIS — G56.02 LEFT CARPAL TUNNEL SYNDROME: Primary | ICD-10-CM

## 2022-03-16 PROCEDURE — 99214 OFFICE O/P EST MOD 30 MIN: CPT | Performed by: SPECIALIST

## 2022-03-16 RX ORDER — ALBUTEROL SULFATE 90 UG/1
AEROSOL, METERED RESPIRATORY (INHALATION)
COMMUNITY
Start: 2022-01-19

## 2022-03-16 NOTE — PROGRESS NOTES
Patient: Anupama Aguillon                MRN: [id number]       SSN: [id number]  YOB: 1985        AGE: 39 y.o. SEX: female    PCP: Belkys Sapp MD  03/16/22    Chief Complaint   Patient presents with    Hand Pain     Left     HISTORY:  Anupama Aguillon is a 39 y.o. female who is seen for left hand numbness, tingling, and pain. She has been experiencing left numbness and tingling for the past several months. She reports difficulty pinching, grabbing, and holding items. She reports increased numbness and tingling at night and with use. Her whole hand felt like it was on fire two nights ago. She notes a painful burning sensation every night. A LUE EMG/NCS on 1/27/22 by Dr. Lisa Mcmanus revealed mild CTS. She was previously seen for right wrist pain. She is s/p right carpal tunnel release on 7/28/21 -- doing well. She had a yeast infection 48 hours after he surgery-- seen and treated by her gynecologist.     She is s/p neurosurgical decompression procedure for Chiari malformation. She still sees her neurologist every 3 months for follow up. Pain Assessment  3/16/2022   Location of Pain Hand   Location Modifiers Left   Severity of Pain 10   Quality of Pain Sharp;Dull;Aching;Burning; Other (Comment); Throbbing   Quality of Pain Comment Tingling   Duration of Pain Persistent   Frequency of Pain Constant   Date Pain First Started -   Date Pain First Started Comment -   Aggravating Factors -   Aggravating Factors Comment -   Limiting Behavior Yes   Relieving Factors Nothing   Result of Injury No     Occupation, etc:  Ms. 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 She lives with her 7 yo daughter in [place]. Ms. Kymberly Mcgovern weighs 180 lbs and is 5'2\" tall.        No results found for: HBA1C, OWB8KTNE, SBU0SXYQ, NOH7NGBM  Weight Metrics 3/16/2022 8/13/2021 7/20/2021 7/24/2018 7/12/2018 11/9/2016 11/9/2016   Weight 180 lb 188 lb 185 lb 171 lb 9.6 oz 167 lb 173 lb 167 lb 8.8 oz BMI 34.01 kg/m2 34.39 kg/m2 33.84 kg/m2 32.42 kg/m2 31.55 kg/m2 32.69 kg/m2 31.66 kg/m2       There is no problem list on file for this patient. REVIEW OF SYSTEMS:    Constitutional Symptoms: Negative   Eyes: Negative   Ears, Nose, Throat and Mouth: Negative   Cardiovascular: Negative   Respiratory: Negative   Genitourinary: Per HPI   Gastrointestinal: Per HPI   Integumentary (Skin and/or Breast): Negative   Musculoskeletal: Per HPI   Endocrine/Rheumatologic: Negative   Neurological: Per HPI   Hematology/Lymphatic: Negative    Allergic/Immunologic: Negative   Phychiatric: Negative    Social History     Socioeconomic History    Marital status: UNKNOWN     Spouse name: Not on file    Number of children: Not on file    Years of education: Not on file    Highest education level: Not on file   Occupational History    Not on file   Tobacco Use    Smoking status: Never Smoker    Smokeless tobacco: Never Used   Vaping Use    Vaping Use: Never used   Substance and Sexual Activity    Alcohol use: Not Currently    Drug use: No    Sexual activity: Not on file   Other Topics Concern    Not on file   Social History Narrative    Not on file     Social Determinants of Health     Financial Resource Strain:     Difficulty of Paying Living Expenses: Not on file   Food Insecurity:     Worried About Running Out of Food in the Last Year: Not on file    Lu of Food in the Last Year: Not on file   Transportation Needs:     Lack of Transportation (Medical): Not on file    Lack of Transportation (Non-Medical):  Not on file   Physical Activity:     Days of Exercise per Week: Not on file    Minutes of Exercise per Session: Not on file   Stress:     Feeling of Stress : Not on file   Social Connections:     Frequency of Communication with Friends and Family: Not on file    Frequency of Social Gatherings with Friends and Family: Not on file    Attends Religion Services: Not on file   [employer] of Clubs or Organizations: Not on file    Attends Club or Organization Meetings: Not on file    Marital Status: Not on file   Intimate Partner Violence:     Fear of Current or Ex-Partner: Not on file    Emotionally Abused: Not on file    Physically Abused: Not on file    Sexually Abused: Not on file   Housing Stability:     Unable to Pay for Housing in the Last Year: Not on file    Number of [place] in the Last Year: Not on file    Unstable Housing in the Last Year: Not on file      Allergies   Allergen Reactions    Latex Hives and Rash    Codeine Rash    Pcn [Penicillins] Hives      Current Outpatient Medications   Medication Sig    albuterol (PROVENTIL HFA, VENTOLIN HFA, PROAIR HFA) 90 mcg/actuation inhaler inhale 1-2 puffs by mouth every 4 hours    ondansetron (ZOFRAN ODT) 4 mg disintegrating tablet Take 1 Tablet by mouth every eight (8) hours as needed for Nausea or Vomiting. Indications: prevent nausea and vomiting after surgery    ibuprofen (MOTRIN) 200 mg tablet Take 200 mg by mouth.  cyclobenzaprine (FLEXERIL) 10 mg tablet Take 10 mg by mouth.  acyclovir (ZOVIRAX) 800 mg tablet Take 800 mg by mouth as needed.  butalbital-acetaminophen-caffeine (FIORICET, ESGIC) -40 mg per tablet Take 1 Tab by mouth as needed for Pain.  ondansetron (ZOFRAN ODT) 8 mg disintegrating tablet Take 8 mg by mouth as needed for Nausea.  trimethoprim-sulfamethoxazole (BACTRIM DS, SEPTRA DS) 160-800 mg per tablet Take 1 Tablet by mouth two (2) times a day. (Patient not taking: Reported on 3/16/2022)    acetaminophen (TYLENOL EXTRA STRENGTH) 500 mg tablet Take 975 mg by mouth every six (6) hours as needed for Pain. (Patient not taking: Reported on 7/20/2021)    HYDROcodone-acetaminophen (NORCO) 5-325 mg per tablet Take  by mouth every four (4) hours as needed for Pain.  (Patient not taking: Reported on 7/20/2021)    SUMAtriptan succinate (IMITREX STATDOSE KIT REFILL) 6 mg/0.5 mL crtg 6 mg by SubCUTAneous route as needed. (Patient not taking: Reported on 3/16/2022)     No current facility-administered medications for this visit. PHYSICAL EXAMINATION:   Appearance: Alert, well appearing and pleasant patient who is in no distress, oriented to person, place/time, and who follows commands. HEENT: Juan Francisco Doty hears well, does not require hearing aids. Her sclera of the eyes are non-icteric. She is breathing normally and no respiratory accessory muscle use is noted. No JVD present and Neck ROM within normal limits. Psychiatric: Affect and mood are appropriate. Oriented x3  Cardiovascular/Peripheral Vascular: Normal pulses to each foot. Integumentary: No rashes. Warm and normal color. No drainage. Gait: normal  Sensory Exam: decreased sensation to light touch in the median nerve distribution left hand  Lymphatic: No evidence of Lymphedema  Vascular:       Pulses: palpable  Varicosities none  Wounds/Abrasion: well healed R CTR incision site  Neuro: Negative, no tremors  ORTHO EXAMINATION:  Examination Right Wrist Left Wrist   Skin Intact, healing incision site  Suture line intact Intact   Tenderness - -   Flexion 40 40   Extension 30 30   Deformity - -   Effusion - -   Finger flexion Full Full   Finger extension Full Full   Tinel's sign - +   Phalen's test - +   Finklestein maneuver - -   Pain with thumb abduction - -   Capillary refill - -     LUE EMG/NCS 1/27/22 LUZ MARINA  Impression: the electrophysiologic findings are those of a very mild left median neuropathy at the wrist    RUE EMG/NCS 1/4/19  Impression: There is electrophysiological evidence of a mild median neuropathy at the right wrist. These findings are consistent with mild right carpal tunnel syndrome. IMPRESSION:    Left carpal tunnel sydrome  PLAN: She will be scheduled for a left carpal tunnel release due to her severe symptoms. Risks of surgery outlined and informed consent obtained. She will follow up for H&P.       Scribed by Lila Fernandez Trice Barone MD ([address]) as dictated by Hawa Partida MD

## 2022-04-04 DIAGNOSIS — M25.532 LEFT WRIST PAIN: Primary | ICD-10-CM

## 2022-04-14 ENCOUNTER — TELEPHONE (OUTPATIENT)
Dept: ORTHOPEDIC SURGERY | Age: 37
End: 2022-04-14

## 2022-04-14 DIAGNOSIS — G89.18 ACUTE POSTOPERATIVE PAIN: Primary | ICD-10-CM

## 2022-04-14 DIAGNOSIS — G56.02 LEFT CARPAL TUNNEL SYNDROME: Primary | ICD-10-CM

## 2022-04-14 RX ORDER — HYDROCODONE BITARTRATE AND ACETAMINOPHEN 7.5; 325 MG/1; MG/1
1 TABLET ORAL
Qty: 20 TABLET | Refills: 0 | Status: SHIPPED | OUTPATIENT
Start: 2022-04-14 | End: 2022-04-21

## 2022-04-14 NOTE — TELEPHONE ENCOUNTER
Please reach out to the patient and let her know that the prescription for Michael Hernandez has been sent to the [facility] by request.

## 2022-04-14 NOTE — TELEPHONE ENCOUNTER
Patient called stating her pharmacy advised her they do not accept paper prescriptions for Norco, and it needs to be sent electronically or called in. Patient confirmed her pharmacy is [facility] in [place]. Patient had left carpal tunnel release yesterday. Patient is requesting a call when completed and can be reached at [phone].

## 2022-04-14 NOTE — TELEPHONE ENCOUNTER
Patient called again as she has not gotten a reply on this request.  Please return call.      Patient [phone]

## 2022-04-15 ENCOUNTER — OFFICE VISIT (OUTPATIENT)
Dept: ORTHOPEDIC SURGERY | Age: 37
End: 2022-04-15
Payer: COMMERCIAL

## 2022-04-15 VITALS
WEIGHT: 191 LBS | HEART RATE: 99 BPM | RESPIRATION RATE: 18 BRPM | BODY MASS INDEX: 35.15 KG/M2 | HEIGHT: 62 IN | TEMPERATURE: 97.4 F | OXYGEN SATURATION: 99 %

## 2022-04-15 DIAGNOSIS — Z98.890 POST-OPERATIVE STATE: ICD-10-CM

## 2022-04-15 DIAGNOSIS — Z98.890 S/P CARPAL TUNNEL RELEASE: Primary | ICD-10-CM

## 2022-04-15 PROCEDURE — 99024 POSTOP FOLLOW-UP VISIT: CPT | Performed by: SPECIALIST

## 2022-04-15 NOTE — PROGRESS NOTES
Patient: Tung Vanegas                MRN: [id number]       SSN: [id number]  YOB: 1985        AGE: 39 y.o. SEX: female    PCP: Hallie Leonard MD  04/15/22    Chief Complaint   Patient presents with    Wrist Pain     left wrist pain     HISTORY:  Tung Vanegas is a 39 y.o. female who is s/p left carpal tunnel release on 4/13/22. Her post-op pain is better than it was for her right carpal tunnel syndrome. She denies any itching or burning sensations. She was previously seen for right wrist pain. She is s/p right carpal tunnel release on 7/28/21 -- doing well. She had a yeast infection 48 hours after he surgery-- seen and treated by her gynecologist.     She is s/p neurosurgical decompression procedure for Chiari malformation. She still sees her neurologist every 3 months for follow up. Pain Assessment  4/15/2022   Location of Pain Wrist   Location Modifiers Left   Severity of Pain 5   Quality of Pain Sharp   Quality of Pain Comment sore. 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 She lives with her 5 yo daughter in [place]. Ms. Shanna Perez weighs 180 lbs and is 5'2\" tall. No results found for: HBA1C, CMJ6KPNN, ABG2SALZ, UAP3UMKT  Weight Metrics 4/15/2022 3/16/2022 8/13/2021 7/20/2021 7/24/2018 7/12/2018 11/9/2016   Weight 191 lb 180 lb 188 lb 185 lb 171 lb 9.6 oz 167 lb 173 lb   BMI 34.93 kg/m2 34.01 kg/m2 34.39 kg/m2 33.84 kg/m2 32.42 kg/m2 31.55 kg/m2 32.69 kg/m2       There is no problem list on file for this patient.     REVIEW OF SYSTEMS:    Constitutional Symptoms: Negative   Eyes: Negative   Ears, Nose, Throat and Mouth: Negative   Cardiovascular: Negative   Respiratory: Negative   Genitourinary: Per HPI   Gastrointestinal: Per HPI   Integumentary (Skin and/or Breast): Negative   Musculoskeletal: Per HPI   Endocrine/Rheumatologic: Negative   Neurological: Per HPI   Hematology/Lymphatic: Negative    Allergic/Immunologic: Negative   Phychiatric: Negative    Social History     Socioeconomic History    Marital status: UNKNOWN     Spouse name: Not on file    Number of children: Not on file    Years of education: Not on file    Highest education level: Not on file   Occupational History    Not on file   Tobacco Use    Smoking status: Never Smoker    Smokeless tobacco: Never Used   Vaping Use    Vaping Use: Never used   Substance and Sexual Activity    Alcohol use: Not Currently    Drug use: No    Sexual activity: Not on file   Other Topics Concern    Not on file   Social History Narrative    Not on file     Social Determinants of Health     Financial Resource Strain:     Difficulty of Paying Living Expenses: Not on file   Food Insecurity:     Worried About Running Out of Food in the Last Year: Not on file    Lu of Food in the Last Year: Not on file   Transportation Needs:     Lack of Transportation (Medical): Not on file    Lack of Transportation (Non-Medical):  Not on file   Physical Activity:     Days of Exercise per Week: Not on file    Minutes of Exercise per Session: Not on file   Stress:     Feeling of Stress : Not on file   Social Connections:     Frequency of Communication with Friends and Family: Not on file    Frequency of Social Gatherings with Friends and Family: Not on file    Attends Lutheran Services: Not on file    Active Member of Clubs or Organizations: Not on file    Attends Club or Organization Meetings: Not on file    Marital Status: Not on file   Intimate Partner Violence:     Fear of Current or Ex-Partner: Not on file    Emotionally Abused: Not on file   Seema Physically Abused: Not on file    Sexually Abused: Not on file   Housing Stability:     Unable to Pay for Housing in the Last Year: Not on file    Number of Places Lived in the Last Year: Not on file    Unstable Housing in the Last Year: Not on file      Allergies   Allergen Reactions    Latex Hives and Rash    Codeine Rash    Pcn [Penicillins] Hives      Current Outpatient Medications   Medication Sig    HYDROcodone-acetaminophen (Norco) 7.5-325 mg per tablet Take 1 Tablet by mouth every six (6) hours as needed for Pain for up to 7 days. Max Daily Amount: 4 Tablets.  albuterol (PROVENTIL HFA, VENTOLIN HFA, PROAIR HFA) 90 mcg/actuation inhaler inhale 1-2 puffs by mouth every 4 hours    ondansetron (ZOFRAN ODT) 4 mg disintegrating tablet Take 1 Tablet by mouth every eight (8) hours as needed for Nausea or Vomiting. Indications: prevent nausea and vomiting after surgery    ibuprofen (MOTRIN) 200 mg tablet Take 200 mg by mouth.  cyclobenzaprine (FLEXERIL) 10 mg tablet Take 10 mg by mouth.  acyclovir (ZOVIRAX) 800 mg tablet Take 800 mg by mouth as needed.  butalbital-acetaminophen-caffeine (FIORICET, ESGIC) -40 mg per tablet Take 1 Tab by mouth as needed for Pain.  ondansetron (ZOFRAN ODT) 8 mg disintegrating tablet Take 8 mg by mouth as needed for Nausea.  trimethoprim-sulfamethoxazole (BACTRIM DS, SEPTRA DS) 160-800 mg per tablet Take 1 Tablet by mouth two (2) times a day. (Patient not taking: Reported on 3/16/2022)    acetaminophen (TYLENOL EXTRA STRENGTH) 500 mg tablet Take 975 mg by mouth every six (6) hours as needed for Pain. (Patient not taking: Reported on 7/20/2021)    HYDROcodone-acetaminophen (NORCO) 5-325 mg per tablet Take  by mouth every four (4) hours as needed for Pain. (Patient not taking: Reported on 7/20/2021)    SUMAtriptan succinate (IMITREX STATDOSE KIT REFILL) 6 mg/0.5 mL crtg 6 mg by SubCUTAneous route as needed.  (Patient not taking: Reported on 3/16/2022) No current facility-administered medications for this visit. PHYSICAL EXAMINATION:   Appearance: Alert, well appearing and pleasant patient who is in no distress, oriented to person, place/time, and who follows commands. HEENT: Isabelle Valle hears well, does not require hearing aids. Her sclera of the eyes are non-icteric. She is breathing normally and no respiratory accessory muscle use is noted. No JVD present and Neck ROM within normal limits. Psychiatric: Affect and mood are appropriate. Oriented x3  Cardiovascular/Peripheral Vascular: Normal pulses to each foot. Integumentary: No rashes. Warm and normal color. No drainage. Gait: normal  Sensory Exam: decreased sensation to light touch in the median nerve distribution left hand  Lymphatic: No evidence of Lymphedema  Vascular:       Pulses: palpable  Varicosities none  Wounds/Abrasion: well healed R CTR incision site  Neuro: Negative, no tremors  ORTHO EXAMINATION:  Examination Right Wrist Left Wrist   Skin Intact, healing incision site  Suture line intact Intact   Tenderness - -   Flexion 40 40   Extension 30 30   Deformity - -   Effusion - -   Finger flexion Full Full   Finger extension Full Full   Tinel's sign - +   Phalen's test - +   Finklestein maneuver - -   Pain with thumb abduction - -   Capillary refill - -     LUE EMG/NCS 1/27/22 [facility]  Impression: the electrophysiologic findings are those of a very mild left median neuropathy at the wrist    RUE EMG/NCS 1/4/19  Impression: There is electrophysiological evidence of a mild median neuropathy at the right wrist. These findings are consistent with mild right carpal tunnel syndrome. IMPRESSION:      ICD-10-CM ICD-9-CM    1. S/P carpal tunnel release  Z98.890 V45.89    2. Post-operative state  Z98.890 V45.89       PLAN: Sutures cleaned and dressings applied. Clean incision site with peroxide at home. There is no need for surgery at this time. She will follow up in 10 days.      Scribed by Neda Perez MD ([address]) as dictated by Neda Perez MD

## 2022-04-15 NOTE — LETTER
NOTIFICATION RETURN TO WORK     4/15/2022 4:17 PM    Ms. Mallissa Goltz  [facility] [phone]      To Whom It May Concern:    Mallissa Goltz is currently under the care of [address] [place] [address]. She will return to work/school on Wed. 4-20-22--limited use left arm until seen here 4-25-22      If there are questions or concerns please have the patient contact our office.         Sincerely,        Nanette Kimbrough MD

## 2022-04-20 ENCOUNTER — OFFICE VISIT (OUTPATIENT)
Dept: ORTHOPEDIC SURGERY | Age: 37
End: 2022-04-20
Payer: COMMERCIAL

## 2022-04-20 VITALS — TEMPERATURE: 98.6 F | BODY MASS INDEX: 35 KG/M2 | WEIGHT: 190.2 LBS | HEIGHT: 62 IN

## 2022-04-20 DIAGNOSIS — Z98.890 S/P CARPAL TUNNEL RELEASE: Primary | ICD-10-CM

## 2022-04-20 DIAGNOSIS — Z98.890 POST-OPERATIVE STATE: ICD-10-CM

## 2022-04-20 PROCEDURE — 99024 POSTOP FOLLOW-UP VISIT: CPT | Performed by: SPECIALIST

## 2022-04-20 NOTE — PROGRESS NOTES
Patient: Isabelle Valle                MRN: [id number]       SSN: [id number]  YOB: 1985        AGE: 39 y.o. SEX: female    PCP: Eliel Carter MD  04/20/22    CC: LEFT WRIST WOUND CHECK    HISTORY:  Isabelle Valle is a 39 y.o. female who returns for a wound check. She is s/p left carpal tunnel release on 4/13/22. Her post-op pain is better than it was for her right carpal tunnel syndrome. She was previously seen for right wrist pain. She is s/p right carpal tunnel release on 7/28/21 -- doing well. She had a yeast infection 48 hours after he surgery-- seen and treated by her gynecologist.     She is s/p neurosurgical decompression procedure for Chiari malformation. She still sees her neurologist every 3 months for follow up. Pain Assessment  4/15/2022   Location of Pain Wrist   Location Modifiers Left   Severity of Pain 5   Quality of Pain Sharp   Quality of Pain Comment sore. 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 She lives with her 7 yo daughter in [place]. Ms. Robert Ruvalcaba weighs 180 lbs and is 5'2\" tall. No results found for: HBA1C, UAY7AZLJ, FUM5PIUL, VYM4IGVZ  Weight Metrics 4/20/2022 4/15/2022 3/16/2022 8/13/2021 7/20/2021 7/24/2018 7/12/2018   Weight 190 lb 3.2 oz 191 lb 180 lb 188 lb 185 lb 171 lb 9.6 oz 167 lb   BMI 34.79 kg/m2 34.93 kg/m2 34.01 kg/m2 34.39 kg/m2 33.84 kg/m2 32.42 kg/m2 31.55 kg/m2       There is no problem list on file for this patient.     REVIEW OF SYSTEMS:    Constitutional Symptoms: Negative   Eyes: Negative   Ears, Nose, Throat and Mouth: Negative   Cardiovascular: Negative   Respiratory: Negative   Genitourinary: Per HPI   Gastrointestinal: Per HPI   Integumentary (Skin and/or Breast): Negative   Musculoskeletal: Per HPI   Endocrine/Rheumatologic: Negative   Neurological: Per HPI   Hematology/Lymphatic: Negative    Allergic/Immunologic: Negative   Phychiatric: Negative    Social History     Socioeconomic History    Marital status: UNKNOWN     Spouse name: Not on file    Number of children: Not on file    Years of education: Not on file    Highest education level: Not on file   Occupational History    Not on file   Tobacco Use    Smoking status: Never Smoker    Smokeless tobacco: Never Used   Vaping Use    Vaping Use: Never used   Substance and Sexual Activity    Alcohol use: Not Currently    Drug use: No    Sexual activity: Not on file   Other Topics Concern    Not on file   Social History Narrative    Not on file     Social Determinants of Health     Financial Resource Strain:     Difficulty of Paying Living Expenses: Not on file   Food Insecurity:     Worried About Running Out of Food in the Last Year: Not on file    Lu of Food in the Last Year: Not on file   Transportation Needs:     Lack of Transportation (Medical): Not on file    Lack of Transportation (Non-Medical):  Not on file   Physical Activity:     Days of Exercise per Week: Not on file    Minutes of Exercise per Session: Not on file   Stress:     Feeling of Stress : Not on file   Social Connections:     Frequency of Communication with Friends and Family: Not on file    Frequency of Social Gatherings with Friends and Family: Not on file    Attends Yazidi Services: Not on file    Active Member of Clubs or Organizations: Not on file    Attends Club or Organization Meetings: Not on file    Marital Status: Not on file   Intimate Partner Violence:     Fear of Current or Ex-Partner: Not on file    Emotionally Abused: Not on file    Physically Abused: Not on file    Sexually Abused: Not on file   Housing Stability:     Unable to Pay for Housing in the Last Year: Not on file    Number of Places Lived in the Last Year: Not on file    Unstable Housing in the Last Year: Not on file      Allergies   Allergen Reactions    Latex Hives and Rash    Codeine Rash    Pcn [Penicillins] Hives      Current Outpatient Medications   Medication Sig    albuterol (PROVENTIL HFA, VENTOLIN HFA, PROAIR HFA) 90 mcg/actuation inhaler inhale 1-2 puffs by mouth every 4 hours    ibuprofen (MOTRIN) 200 mg tablet Take 200 mg by mouth.  cyclobenzaprine (FLEXERIL) 10 mg tablet Take 10 mg by mouth.  acetaminophen (TYLENOL EXTRA STRENGTH) 500 mg tablet Take 975 mg by mouth every six (6) hours as needed for Pain.  SUMAtriptan succinate (IMITREX STATDOSE KIT REFILL) 6 mg/0.5 mL crtg 6 mg by SubCUTAneous route as needed.  acyclovir (ZOVIRAX) 800 mg tablet Take 800 mg by mouth as needed.  butalbital-acetaminophen-caffeine (FIORICET, ESGIC) -40 mg per tablet Take 1 Tab by mouth as needed for Pain.  ondansetron (ZOFRAN ODT) 8 mg disintegrating tablet Take 8 mg by mouth as needed for Nausea.  HYDROcodone-acetaminophen (Norco) 7.5-325 mg per tablet Take 1 Tablet by mouth every six (6) hours as needed for Pain for up to 7 days. Max Daily Amount: 4 Tablets. (Patient not taking: Reported on 4/20/2022)    trimethoprim-sulfamethoxazole (BACTRIM DS, SEPTRA DS) 160-800 mg per tablet Take 1 Tablet by mouth two (2) times a day. (Patient not taking: Reported on 3/16/2022)    ondansetron (ZOFRAN ODT) 4 mg disintegrating tablet Take 1 Tablet by mouth every eight (8) hours as needed for Nausea or Vomiting. Indications: prevent nausea and vomiting after surgery (Patient not taking: Reported on 4/20/2022)    HYDROcodone-acetaminophen (NORCO) 5-325 mg per tablet Take  by mouth every four (4) hours as needed for Pain.  (Patient not taking: Reported on 7/20/2021)     No current facility-administered medications for this visit. PHYSICAL EXAMINATION:   Appearance: Alert, well appearing and pleasant patient who is in no distress, oriented to person, place/time, and who follows commands. HEENT: Nati Aguillon hears well, does not require hearing aids. Her sclera of the eyes are non-icteric. She is breathing normally and no respiratory accessory muscle use is noted. No JVD present and Neck ROM within normal limits. Psychiatric: Affect and mood are appropriate. Oriented x3  Cardiovascular/Peripheral Vascular: Normal pulses to each foot. Integumentary: No rashes. Warm and normal color. No drainage. Gait: normal  Sensory Exam: decreased sensation to light touch in the median nerve distribution left hand  Lymphatic: No evidence of Lymphedema  Vascular:       Pulses: palpable  Varicosities none  Wounds/Abrasion: well healed R CTR incision site  Neuro: Negative, no tremors  ORTHO EXAMINATION:  Examination Right Wrist Left Wrist   Skin Intact, healing incision site  Suture line intact Intact   Tenderness - -   Flexion 40 40   Extension 30 30   Deformity - -   Effusion - -   Finger flexion Full Full   Finger extension Full Full   Tinel's sign - +   Phalen's test - +   Finklestein maneuver - -   Pain with thumb abduction - -   Capillary refill - -     LUE EMG/NCS 1/27/22 [place]  Impression: the electrophysiologic findings are those of a very mild left median neuropathy at the wrist    RUE EMG/NCS 1/4/19  Impression: There is electrophysiological evidence of a mild median neuropathy at the right wrist. These findings are consistent with mild right carpal tunnel syndrome. IMPRESSION:      ICD-10-CM ICD-9-CM    1. S/P carpal tunnel release  Z98.890 V45.89    2. Post-operative state  Z98.890 V45.89       PLAN: Patient followed up for wound check s/p carpal tunnel release. Her wound looks fine.  She will follow up next week for wound check and sutures removal.    Scribed by Willie Warner[address]) as dictated by Isabel Rubio. Betty Smith MD

## 2022-04-20 NOTE — LETTER
NOTIFICATION RETURN TO WORK     4/20/2022 11:53 AM    [facility] Isabelle [facility] [phone]      To Whom It May Concern:    Isabelle Valle is currently under the care of [address] [place], [place] [address]. She was seen in office today. If there are questions or concerns please have the patient contact our office.         Sincerely,        Nichole Ridley MD

## 2022-04-27 ENCOUNTER — OFFICE VISIT (OUTPATIENT)
Dept: ORTHOPEDIC SURGERY | Age: 37
End: 2022-04-27
Payer: COMMERCIAL

## 2022-04-27 VITALS
WEIGHT: 186 LBS | BODY MASS INDEX: 35.12 KG/M2 | HEIGHT: 61 IN | HEART RATE: 95 BPM | OXYGEN SATURATION: 98 % | TEMPERATURE: 98.6 F

## 2022-04-27 DIAGNOSIS — Z98.890 HISTORY OF CARPAL TUNNEL RELEASE: ICD-10-CM

## 2022-04-27 DIAGNOSIS — Z87.898 NO POST-OP COMPLICATIONS: Primary | ICD-10-CM

## 2022-04-27 PROCEDURE — 99024 POSTOP FOLLOW-UP VISIT: CPT | Performed by: SPECIALIST

## 2022-04-27 RX ORDER — DOXYCYCLINE 100 MG/1
CAPSULE ORAL
COMMUNITY
Start: 2022-04-21

## 2022-04-27 NOTE — PROGRESS NOTES
Patient: Ugo Belle                MRN: [id number]       SSN: [id number]  YOB: 1985        AGE: 39 y.o. SEX: female    PCP: Tiffanie Sheppard MD  04/27/22    CC: LEFT WRIST WOUND CHECK    HISTORY:  Ugo Belle is a 39 y.o. female who returns for a wound check. She is s/p left carpal tunnel release on 4/13/22. Her post-op pain is better than it was for her right carpal tunnel syndrome. She was seen at the [facility] ED on 4/20/22. She was vomiting after last ov. She was prescribed an antibiotic. A pelvis CT revealed an ovarian cyst.    She was previously seen for right wrist pain. She is s/p right carpal tunnel release on 7/28/21 -- doing well. She had a yeast infection 48 hours after he surgery-- seen and treated by her gynecologist.     She is s/p neurosurgical decompression procedure for Chiari malformation. She still sees her neurologist every 3 months for follow up. 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 She lives with her [address] daughter in [place]. Ms. Angela Watts weighs 180 lbs and is 5'2\" tall. No results found for: HBA1C, [id number], EBJ8LXKU, JXT0ECIS  Weight Metrics 4/27/2022 4/20/2022 4/15/2022 3/16/2022 8/13/2021 7/20/2021 7/24/2018   Weight 186 lb 190 lb 3.2 oz 191 lb 180 lb 188 lb 185 lb 171 lb 9.6 oz   BMI 35.14 kg/m2 34.79 kg/m2 34.93 kg/m2 34.01 kg/m2 34.39 kg/m2 33.84 kg/m2 32.42 kg/m2       There is no problem list on file for this patient.     REVIEW OF SYSTEMS:    Constitutional Symptoms: Negative   Eyes: Negative   Ears, Nose, Throat and Mouth: Negative   Cardiovascular: Negative   Respiratory: Negative   Genitourinary: Per HPI   Gastrointestinal: Per HPI   Integumentary (Skin and/or Breast): Negative   Musculoskeletal: Per HPI   Endocrine/Rheumatologic: Negative   Neurological: Per HPI   Hematology/Lymphatic: Negative    Allergic/Immunologic: Negative   Phychiatric: Negative    Social History     Socioeconomic History    Marital status: UNKNOWN     Spouse name: Not on file    Number of children: Not on file    Years of education: Not on file    Highest education level: Not on file   Occupational History    Not on file   Tobacco Use    Smoking status: Never Smoker    Smokeless tobacco: Never Used   Vaping Use    Vaping Use: Never used   Substance and Sexual Activity    Alcohol use: Not Currently    Drug use: No    Sexual activity: Not on file   Other Topics Concern    Not on file   Social History Narrative    Not on file     Social Determinants of Health     Financial Resource Strain:     Difficulty of Paying Living Expenses: Not on file   Food Insecurity:     Worried About Running Out of Food in the Last Year: Not on file    Lu of Food in the Last Year: Not on file   Transportation Needs:     Lack of Transportation (Medical): Not on file    Lack of Transportation (Non-Medical):  Not on file   Physical Activity:     Days of Exercise per Week: Not on file    Minutes of Exercise per Session: Not on file   Stress:     Feeling of Stress : Not on file   Social Connections:     Frequency of Communication with Friends and Family: Not on file    Frequency of Social Gatherings with Friends and Family: Not on file    Attends [employer] Services: Not on file    Active Member of Clubs or Organizations: Not on file    Attends Club or Organization Meetings: Not on file    Marital Status: Not on file   Intimate Partner Violence:     Fear of Current or Ex-Partner: Not on file    Emotionally Abused: Not on file    Physically Abused: Not on file    Sexually Abused: Not on file   Housing Stability:     Unable to Pay for Housing in the Last Year: Not on file    Number of Places Lived in the Last Year: Not on file    Unstable Housing in the Last Year: Not on file      Allergies   Allergen Reactions    Latex Hives and Rash    Codeine Rash    Pcn [Penicillins] Hives      Current Outpatient Medications   Medication Sig    albuterol (PROVENTIL HFA, VENTOLIN HFA, PROAIR HFA) 90 mcg/actuation inhaler inhale 1-2 puffs by mouth every 4 hours    trimethoprim-sulfamethoxazole (BACTRIM DS, SEPTRA DS) 160-800 mg per tablet Take 1 Tablet by mouth two (2) times a day.  ondansetron (ZOFRAN ODT) 4 mg disintegrating tablet Take 1 Tablet by mouth every eight (8) hours as needed for Nausea or Vomiting. Indications: prevent nausea and vomiting after surgery    ibuprofen (MOTRIN) 200 mg tablet Take 200 mg by mouth.  cyclobenzaprine (FLEXERIL) 10 mg tablet Take 10 mg by mouth.  acetaminophen (TYLENOL EXTRA STRENGTH) 500 mg tablet Take 975 mg by mouth every six (6) hours as needed for Pain.  SUMAtriptan succinate (IMITREX STATDOSE KIT REFILL) 6 mg/0.5 mL crtg 6 mg by SubCUTAneous route as needed.  acyclovir (ZOVIRAX) 800 mg tablet Take 800 mg by mouth as needed.  butalbital-acetaminophen-caffeine (FIORICET, ESGIC) -40 mg per tablet Take 1 Tab by mouth as needed for Pain.  ondansetron (ZOFRAN ODT) 8 mg disintegrating tablet Take 8 mg by mouth as needed for Nausea.  doxycycline (MONODOX) 100 mg capsule TAKE 1 CAPSULE BY MOUTH TWICE DAILY FOR 7 DAYS    HYDROcodone-acetaminophen (NORCO) 5-325 mg per tablet Take  by mouth every four (4) hours as needed for Pain. (Patient not taking: Reported on 4/27/2022)     No current facility-administered medications for this visit.       PHYSICAL EXAMINATION:   Appearance: Alert, well appearing and pleasant patient who is in no distress, oriented to person, place/time, and who follows commands. HEENT: Linda Delgado hears well, does not require hearing aids. Her sclera of the eyes are non-icteric. She is breathing normally and no respiratory accessory muscle use is noted. No JVD present and Neck ROM within normal limits. Psychiatric: Affect and mood are appropriate. Oriented x3  Cardiovascular/Peripheral Vascular: Normal pulses to each foot. Integumentary: No rashes. Warm and normal color. No drainage. Gait: normal  Sensory Exam: decreased sensation to light touch in the median nerve distribution left hand  Lymphatic: No evidence of Lymphedema  Vascular:       Pulses: palpable  Varicosities none  Wounds/Abrasion: well healed R CTR incision site  Neuro: Negative, no tremors  ORTHO EXAMINATION:  Examination Right Wrist Left Wrist   Skin Intact, healing incision site  Suture line intact Intact   Tenderness - -   Flexion 40 40   Extension 30 30   Deformity - -   Effusion - -   Finger flexion Full Full   Finger extension Full Full   Tinel's sign - +   Phalen's test - +   Finklestein maneuver - -   Pain with thumb abduction - -   Capillary refill - -     CT PELVIS 4/20/22  IMPRESSION   1. Normal appendix. 2. Left adnexal structure likely represents an ovarian cyst versus corpus luteal cyst.   3. Small amount of free fluid in the pelvis, likely physiologic in a patient this age. LUE EMG/NCS 1/27/22 LUZ MARINA  Impression: the electrophysiologic findings are those of a very mild left median neuropathy at the wrist    RUE EMG/NCS 1/4/19  Impression: There is electrophysiological evidence of a mild median neuropathy at the right wrist. These findings are consistent with mild right carpal tunnel syndrome. IMPRESSION:      ICD-10-CM ICD-9-CM    1. No post-op complications  V93.378 G28.5    2. History of carpal tunnel release  Z98.890 V45.89       PLAN: Work note provided -- return to full work duty activities on 5/2/22. Her wound looks fine.  She will follow up as needed.      Scribed by MD Juan A Chen) as dictated by Sukhi Castaneda MD

## 2022-04-27 NOTE — LETTER
NOTIFICATION RETURN TO WORK     4/27/2022 10:07 AM    Ms. Jarret Ramos  [facility] [address] [phone]      To Whom It May Concern:    Jarret Ramos is currently under the care of [address] [place] [address] [place]. She will return to full work activities Monday 5-2-22. She was in office 7:45am through 10:10am today 4-27-22. If there are questions or concerns please have the patient contact our office.         Sincerely,        Lonni Hatchet, MD